# Patient Record
Sex: MALE | Race: WHITE | HISPANIC OR LATINO | URBAN - METROPOLITAN AREA
[De-identification: names, ages, dates, MRNs, and addresses within clinical notes are randomized per-mention and may not be internally consistent; named-entity substitution may affect disease eponyms.]

---

## 2017-07-22 ENCOUNTER — EMERGENCY (EMERGENCY)
Facility: HOSPITAL | Age: 47
LOS: 0 days | Discharge: HOME | End: 2017-07-22

## 2017-07-22 DIAGNOSIS — L50.0 ALLERGIC URTICARIA: ICD-10-CM

## 2017-07-22 DIAGNOSIS — Z88.8 ALLERGY STATUS TO OTHER DRUGS, MEDICAMENTS AND BIOLOGICAL SUBSTANCES STATUS: ICD-10-CM

## 2019-12-24 ENCOUNTER — OUTPATIENT (OUTPATIENT)
Dept: OUTPATIENT SERVICES | Facility: HOSPITAL | Age: 49
LOS: 1 days | Discharge: HOME | End: 2019-12-24

## 2019-12-24 ENCOUNTER — APPOINTMENT (OUTPATIENT)
Dept: UROLOGY | Facility: CLINIC | Age: 49
End: 2019-12-24
Payer: COMMERCIAL

## 2019-12-24 VITALS
BODY MASS INDEX: 22.9 KG/M2 | SYSTOLIC BLOOD PRESSURE: 138 MMHG | DIASTOLIC BLOOD PRESSURE: 82 MMHG | WEIGHT: 160 LBS | HEIGHT: 70 IN | HEART RATE: 73 BPM

## 2019-12-24 DIAGNOSIS — Z78.9 OTHER SPECIFIED HEALTH STATUS: ICD-10-CM

## 2019-12-24 DIAGNOSIS — Z80.42 FAMILY HISTORY OF MALIGNANT NEOPLASM OF PROSTATE: ICD-10-CM

## 2019-12-24 PROBLEM — Z00.00 ENCOUNTER FOR PREVENTIVE HEALTH EXAMINATION: Status: ACTIVE | Noted: 2019-12-24

## 2019-12-24 PROCEDURE — 99203 OFFICE O/P NEW LOW 30 MIN: CPT

## 2019-12-24 NOTE — ASSESSMENT
[FreeTextEntry1] : Physical exam shows him to be in fairly good shape and spirits. He has very mild left dorsolateral neurovascular thickening / Peyronie’s. Not enough to interfere with sexual activity. 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 His BC reflex was present but weak rectal tone was acceptable and his prostate is less than 10 g\par \par I believe the right orchalgia is due to the spermatocele we will get a scrotal ultrasound to verify. His voiding dysfunction may be related to tension and will have him keep a record of his intake and output I send it for a renal bladder ultrasound to make sure not missing anything and because of his family history we will get a PSA. When he comes back in will review the data will consider a flow study and depending on what I find it may or may not recommend behavior modification and/or medication\par

## 2019-12-24 NOTE — LETTER HEADER
[FreeTextEntry3] : Isidro Orourke M.D.\par Director of Urology\par [facility]\par [address] [place] [address], [address]\[place] [address]

## 2019-12-24 NOTE — LETTER BODY
[Dear  ___] : Dear ~REBEKA, [Consult Letter:] : I had the pleasure of evaluating your patient, [unfilled]. [Please see my note below.] : Please see my note below. [Consult Closing:] : Thank you very much for allowing me to participate in the care of this patient.  If you have any questions, please do not hesitate to contact me. [FreeTextEntry2] : Caprice BEEBE\par [facility]\par [address] [place] [address]\par [place] [phone]

## 2019-12-24 NOTE — HISTORY OF PRESENT ILLNESS
[FreeTextEntry1] : Ty is a 49-year-old male who has at least a two year history of intermittent right testicular pain. He seen Dr. Delgado at [facility] who felt there was nothing major. He is also had some vague lower urinary tract symptoms with mild incomplete emptying – two, frequency – two, intermittency – one, urgency – two, weak stream – three, straining – one. Wake up occasionally once at night and for the whole he is mostly satisfied though if we can help avoid better he would check. Is no history of blood pus infection no trouble with erections in his wife have two children that’s all his wife wanted.\par \par The family history is positive for a father who had prostate cancer was treated and is now HARVEY he said recent blood tests that were normal but he says he is not had a PSA checked. [Urinary Frequency] : urinary frequency [Urinary Urgency] : urinary urgency [Nocturia] : nocturia [Straining] : straining [Erectile Dysfunction] : no Erectile Dysfunction [Weak Stream] : weak stream [Dysuria] : no dysuria [Hematuria - Gross] : no gross hematuria [Flank Pain] : no flank pain [3] : 3 [Aching] : aching [Gradual] : gradual [___ Month(s) Ago] : [unfilled] month(s) ago [Intermittent] : intermittently [___ x Week] : occur [unfilled] times a week [Mild] : mild [___ Minute(s)] : last for [unfilled] minute(s) [Unchanged] : unchanged [None] : No relieving factors are noted [de-identified] : right testicle [de-identified] : touch

## 2019-12-24 NOTE — PHYSICAL EXAM
[General Appearance - Well Developed] : well developed [General Appearance - Well Nourished] : well nourished [Normal Appearance] : normal appearance [Well Groomed] : well groomed [General Appearance - In No Acute Distress] : no acute distress [Heart Rate And Rhythm] : Heart rate and rhythm were normal [Edema] : no peripheral edema [Respiration, Rhythm And Depth] : normal respiratory rhythm and effort [Exaggerated Use Of Accessory Muscles For Inspiration] : no accessory muscle use [Auscultation Breath Sounds / Voice Sounds] : lungs were clear to auscultation bilaterally [Abdomen Soft] : soft [Abdomen Tenderness] : non-tender [Abdomen Hernia] : no hernia was discovered [Costovertebral Angle Tenderness] : no ~M costovertebral angle tenderness [Urethral Meatus] : meatus normal [Penis Abnormality] : normal circumcised penis [Urinary Bladder Findings] : the bladder was normal on palpation [Scrotum] : the scrotum was normal [No Prostate Nodules] : no prostate nodules [Testes Mass (___cm)] : there were no testicular masses [Prostate Size ___ gm] : prostate size [unfilled] gm [] : no rash [Normal Station and Gait] : the gait and station were normal for the patient's age [Oriented To Time, Place, And Person] : oriented to person, place, and time [FreeTextEntry1] : DTR's & BC reflexes were intact though BC was weak [Affect] : the affect was normal [Mood] : the mood was normal [Not Anxious] : not anxious [Inguinal Lymph Nodes Enlarged Bilaterally] : inguinal

## 2019-12-26 DIAGNOSIS — N39.0 URINARY TRACT INFECTION, SITE NOT SPECIFIED: ICD-10-CM

## 2019-12-26 LAB
APPEARANCE: CLEAR
BACTERIA UR CULT: NORMAL
BILIRUBIN URINE: NEGATIVE
BLOOD URINE: NEGATIVE
COLOR: COLORLESS
GLUCOSE QUALITATIVE U: NEGATIVE
KETONES URINE: NEGATIVE
LEUKOCYTE ESTERASE URINE: NEGATIVE
NITRITE URINE: NEGATIVE
PH URINE: 7.5
PROTEIN URINE: NEGATIVE
SPECIFIC GRAVITY URINE: 1
UROBILINOGEN URINE: NORMAL

## 2019-12-27 LAB — URINE CYTOLOGY: NORMAL

## 2020-01-18 ENCOUNTER — FORM ENCOUNTER (OUTPATIENT)
Age: 50
End: 2020-01-18

## 2020-01-19 ENCOUNTER — OUTPATIENT (OUTPATIENT)
Dept: OUTPATIENT SERVICES | Facility: HOSPITAL | Age: 50
LOS: 1 days | Discharge: HOME | End: 2020-01-19
Payer: COMMERCIAL

## 2020-01-19 DIAGNOSIS — R39.14 FEELING OF INCOMPLETE BLADDER EMPTYING: ICD-10-CM

## 2020-01-19 PROCEDURE — 76870 US EXAM SCROTUM: CPT | Mod: 26

## 2020-01-19 PROCEDURE — 76770 US EXAM ABDO BACK WALL COMP: CPT | Mod: 26

## 2020-02-03 ENCOUNTER — APPOINTMENT (OUTPATIENT)
Dept: UROLOGY | Facility: CLINIC | Age: 50
End: 2020-02-03
Payer: COMMERCIAL

## 2020-02-03 VITALS
BODY MASS INDEX: 22.9 KG/M2 | HEART RATE: 56 BPM | SYSTOLIC BLOOD PRESSURE: 134 MMHG | WEIGHT: 160 LBS | DIASTOLIC BLOOD PRESSURE: 83 MMHG | HEIGHT: 70 IN

## 2020-02-03 PROCEDURE — 51798 US URINE CAPACITY MEASURE: CPT

## 2020-02-03 PROCEDURE — 99214 OFFICE O/P EST MOD 30 MIN: CPT | Mod: 25

## 2020-02-03 PROCEDURE — 51741 ELECTRO-UROFLOWMETRY FIRST: CPT

## 2020-02-03 NOTE — LETTER BODY
[Dear  ___] : Dear ~REBEKA, [Courtesy Letter:] : I had the pleasure of seeing your patient, [unfilled], in my office today. [Please see my note below.] : Please see my note below. [Consult Closing:] : Thank you very much for allowing me to participate in the care of this patient.  If you have any questions, please do not hesitate to contact me. [Sincerely,] : Sincerely, [FreeTextEntry2] : Caprice BEEBE\par [facility]\par [address] [place] [address]\par [place] [phone]

## 2020-02-03 NOTE — HISTORY OF PRESENT ILLNESS
[FreeTextEntry1] : Ty is a 49-year-old male who was seen on December 24, 2019 for some right testicular discomfort which he’s had her on and off for two years. He also had some vague lower urinary tract symptoms. Examination showed a right spermatocele which I felt was probably the cause of his pain some mild Peyronie’s which in and feel was clinically significant and he had a small prostate we sent him for a scrotal ultrasound, he can keep a record of his intake and output getting a renal bladder ultrasound and given his family history we got a PSA. Depending on the findings they were going to consider a flow study. When he came in he had about 80 ounces and the two hours before he got here had a stronger so flow study was done and it was looked at after we reviewed the record of his intake and output. [Urinary Urgency] : urinary urgency [Urinary Frequency] : urinary frequency [Nocturia] : nocturia [Straining] : straining [Weak Stream] : weak stream [Erectile Dysfunction] : no Erectile Dysfunction [Dysuria] : no dysuria [Hematuria - Gross] : no gross hematuria [3] : 3 [Flank Pain] : no flank pain [Aching] : aching [Gradual] : gradual [___ Month(s) Ago] : [unfilled] month(s) ago [___ x Week] : occur [unfilled] times a week [Intermittent] : intermittently [___ Minute(s)] : last for [unfilled] minute(s) [Mild] : mild [Unchanged] : unchanged [None] : No relieving factors are noted [de-identified] : right testicle [de-identified] : touch

## 2020-02-03 NOTE — LETTER HEADER
[FreeTextEntry3] : Isidro Orourke M.D.\par Director of Urology\par [facility]\par [address] [place] [address], [address]\[place] [address]

## 2020-02-03 NOTE — ASSESSMENT
[FreeTextEntry1] : With respect to his tenderness in the testicle I think it’s the spermatocele. When it grows large enough of the parietal Tunica vaginalis should eventually stretch or tear and the pain should go away. If he gets big enough for bothersome enough we can either try percutaneous puncture or a surgical excision but for now I would recommend doing nothing.\par \par With respect to his voiding symptoms if you drink a lot he fluid you going to urinate off and I don’t think this is a urologic issue just the amount that he’s drinking. He can try keeping his intake “down” to 2.5 L and see if his symptoms rashmi. If they do then this is a quality choice if he prefers to drink and urinate then to drink more and urinate more. If he prefers to urinate less than he will drink less. He can take in 4 L and not urinate out 2 ½ or more.\par

## 2020-08-03 ENCOUNTER — APPOINTMENT (OUTPATIENT)
Dept: UROLOGY | Facility: CLINIC | Age: 50
End: 2020-08-03
Payer: COMMERCIAL

## 2020-08-03 VITALS
HEART RATE: 64 BPM | BODY MASS INDEX: 24.34 KG/M2 | DIASTOLIC BLOOD PRESSURE: 88 MMHG | WEIGHT: 170 LBS | SYSTOLIC BLOOD PRESSURE: 128 MMHG | HEIGHT: 70 IN

## 2020-08-03 PROCEDURE — 99213 OFFICE O/P EST LOW 20 MIN: CPT

## 2020-08-03 NOTE — LETTER BODY
[Dear  ___] : Dear ~REBEKA, [Courtesy Letter:] : I had the pleasure of seeing your patient, [unfilled], in my office today. [Please see my note below.] : Please see my note below. [Consult Closing:] : Thank you very much for allowing me to participate in the care of this patient.  If you have any questions, please do not hesitate to contact me. [Sincerely,] : Sincerely, [FreeTextEntry2] : Caprice BEEBE\par [facility]\par [address] [place] [address]\par [place] [phone]

## 2020-08-03 NOTE — ASSESSMENT
[FreeTextEntry1] : His PSA is borderline elevated, at 1.1, with a very small prostate on rectal examination. Given his family history of prostate cancer I would  like to repeat a PSA, which was drawn today. We'll have a telemedicine conference to review the results.\par \par As far as his or culture he says it is no longer bothering him\par \par As far as the urinary symptoms is not bothering him enough that he would consider medication or further intervention at this point in time

## 2020-08-03 NOTE — PHYSICAL EXAM
[General Appearance - Well Nourished] : well nourished [Normal Appearance] : normal appearance [General Appearance - Well Developed] : well developed [General Appearance - In No Acute Distress] : no acute distress [Abdomen Soft] : soft [Well Groomed] : well groomed [Costovertebral Angle Tenderness] : no ~M costovertebral angle tenderness [Abdomen Tenderness] : non-tender [Urethral Meatus] : meatus normal [Urinary Bladder Findings] : the bladder was normal on palpation [Penis Abnormality] : normal circumcised penis [Scrotum] : the scrotum was normal [Testes Tenderness] : no tenderness of the testes [Testes Mass (___cm)] : there were no testicular masses [Edema] : no peripheral edema [Respiration, Rhythm And Depth] : normal respiratory rhythm and effort [] : no respiratory distress [Exaggerated Use Of Accessory Muscles For Inspiration] : no accessory muscle use [Oriented To Time, Place, And Person] : oriented to person, place, and time [Affect] : the affect was normal [Mood] : the mood was normal [Not Anxious] : not anxious [Normal Station and Gait] : the gait and station were normal for the patient's age [No Focal Deficits] : no focal deficits [Femoral Lymph Nodes Enlarged Bilaterally] : femoral [Inguinal Lymph Nodes Enlarged Bilaterally] : inguinal [FreeTextEntry1] : Right small spermatocele

## 2020-08-03 NOTE — LETTER HEADER
[FreeTextEntry3] : Isidro Orourke M.D.\par Director of Urology\par [facility]\par [address] [place] [address], [address]\[place] [address]

## 2020-08-03 NOTE — HISTORY OF PRESENT ILLNESS
[Urinary Urgency] : urinary urgency [Straining] : straining [Nocturia] : nocturia [Urinary Frequency] : urinary frequency [Weak Stream] : weak stream [None] : None [___ Month(s) Ago] : [unfilled] month(s) ago [FreeTextEntry1] : Ty is a 49-year-old male We have been following for right spermatocele, which was initially causing him discomfort, and history of bothersome lower urinary tract symptoms. Additionally he has a history of borderline age-specific elevated PSA with a family history of prostate cancer in his father, diagnosed in his 50s.\par \par Today he states he is voiding well and his testicular discomfort has virtually resolved.\par \par He has not yet obtained his PSA. [Erectile Dysfunction] : no Erectile Dysfunction [Dysuria] : no dysuria [Hematuria - Gross] : no gross hematuria [Flank Pain] : no flank pain

## 2020-08-07 LAB
PSA FREE FLD-MCNC: 61 %
PSA FREE SERPL-MCNC: 0.66 NG/ML
PSA SERPL-MCNC: 1.08 NG/ML

## 2020-08-13 ENCOUNTER — APPOINTMENT (OUTPATIENT)
Dept: UROLOGY | Facility: CLINIC | Age: 50
End: 2020-08-13
Payer: COMMERCIAL

## 2020-08-13 DIAGNOSIS — Z87.898 PERSONAL HISTORY OF OTHER SPECIFIED CONDITIONS: ICD-10-CM

## 2020-08-13 DIAGNOSIS — R39.198 OTHER DIFFICULTIES WITH MICTURITION: ICD-10-CM

## 2020-08-13 DIAGNOSIS — N50.819 TESTICULAR PAIN, UNSPECIFIED: ICD-10-CM

## 2020-08-13 DIAGNOSIS — R39.14 FEELING OF INCOMPLETE BLADDER EMPTYING: ICD-10-CM

## 2020-08-13 PROCEDURE — 99214 OFFICE O/P EST MOD 30 MIN: CPT | Mod: 95

## 2020-08-13 NOTE — PHYSICAL EXAM
[General Appearance - Well Developed] : well developed [General Appearance - Well Nourished] : well nourished [Normal Appearance] : normal appearance [Well Groomed] : well groomed [] : no respiratory distress [General Appearance - In No Acute Distress] : no acute distress [Respiration, Rhythm And Depth] : normal respiratory rhythm and effort [Oriented To Time, Place, And Person] : oriented to person, place, and time [Exaggerated Use Of Accessory Muscles For Inspiration] : no accessory muscle use [Mood] : the mood was normal [Affect] : the affect was normal [Not Anxious] : not anxious

## 2020-08-13 NOTE — ASSESSMENT
[FreeTextEntry1] : The PSA came back on August 7, 2028 1.08 and free of 61%. His previous ones January 24, 2020 was 1.1 and 45% so essentially this is no change over almost 7 months. I am comfortable with these numbers, it does not mean he does not have prostate cancer just means that the risk benefit of biopsy favors observation would recommend he get a PSA again in six – twelve months and we will see him then. Given the family history he’d like to get the next repeat value in six months. If it stays low then he would accept going to every 12 months. Obviously if his voiding symptoms or tenderness in the testicle become bothersome he can call and come back soon

## 2020-08-13 NOTE — HISTORY OF PRESENT ILLNESS
[Medical Office: ([facility])___] : at the medical office located in  [Home] : at home, [unfilled] , at the time of the visit. [Verbal consent obtained from patient] : the patient, [unfilled] [FreeTextEntry3] : he has received, reviewed and agreed to the telemedicine consent  [FreeTextEntry1] : I communicated with him by his cell phone at  [phone] and any email communications will be sent to [email or web address]\par \par Ty is a 50-year-old male who we had seen for a tender right testicle and mild low urinary tract symptoms. He was last seen on August 3 by which time his right testicle had improved and didn’t bother him, his voiding dysfunction had improved. However we had sent him for a PSA as his previous PSA was borderline elevated by age specific criteria and by PSA density. He forgot to get it and given the family history of prostate cancer he was going to get the PSA and elected to have a follow-up by TeleMed.\par  [None] : no symptoms

## 2020-08-13 NOTE — LETTER BODY
[Dear  ___] : Dear ~REBEKA, [Please see my note below.] : Please see my note below. [Courtesy Letter:] : I had the pleasure of seeing your patient, [unfilled], in my office today. [Sincerely,] : Sincerely, [Consult Closing:] : Thank you very much for allowing me to participate in the care of this patient.  If you have any questions, please do not hesitate to contact me. [FreeTextEntry2] : Caprice BEEBE\par [facility]\par [address] [place] [address]\par [place] [phone]

## 2020-08-13 NOTE — LETTER HEADER
[FreeTextEntry3] : Isidro Orourke M.D.\par Director of Urology\par [facility]\par [address] [place] [address], [address]\[place] [address]

## 2021-02-17 ENCOUNTER — APPOINTMENT (OUTPATIENT)
Dept: UROLOGY | Facility: CLINIC | Age: 51
End: 2021-02-17
Payer: COMMERCIAL

## 2021-02-17 VITALS
HEART RATE: 73 BPM | TEMPERATURE: 97.4 F | WEIGHT: 176.8 LBS | SYSTOLIC BLOOD PRESSURE: 143 MMHG | BODY MASS INDEX: 25.31 KG/M2 | DIASTOLIC BLOOD PRESSURE: 83 MMHG | HEIGHT: 70 IN

## 2021-02-17 PROCEDURE — 99213 OFFICE O/P EST LOW 20 MIN: CPT

## 2021-02-17 PROCEDURE — 99072 ADDL SUPL MATRL&STAF TM PHE: CPT

## 2021-02-17 NOTE — PHYSICAL EXAM
[General Appearance - Well Developed] : well developed [General Appearance - Well Nourished] : well nourished [Normal Appearance] : normal appearance [Well Groomed] : well groomed [General Appearance - In No Acute Distress] : no acute distress [Abdomen Tenderness] : non-tender [Abdomen Soft] : soft [Costovertebral Angle Tenderness] : no ~M costovertebral angle tenderness [Heart Rate And Rhythm] : Heart rate and rhythm were normal [Edema] : no peripheral edema [] : no respiratory distress [Respiration, Rhythm And Depth] : normal respiratory rhythm and effort [Exaggerated Use Of Accessory Muscles For Inspiration] : no accessory muscle use [Oriented To Time, Place, And Person] : oriented to person, place, and time [Affect] : the affect was normal [Mood] : the mood was normal [Not Anxious] : not anxious [Normal Station and Gait] : the gait and station were normal for the patient's age

## 2021-04-12 ENCOUNTER — APPOINTMENT (OUTPATIENT)
Dept: UROLOGY | Facility: CLINIC | Age: 51
End: 2021-04-12
Payer: COMMERCIAL

## 2021-04-12 PROCEDURE — 99214 OFFICE O/P EST MOD 30 MIN: CPT | Mod: 95

## 2021-04-12 NOTE — HISTORY OF PRESENT ILLNESS
[FreeTextEntry1] : Ty is a 50-year-old male last seen on 2020. He had some occasional pain in the right testicle which we felt was due to a spermatocele see an ultrasound and he had a question of elevated PSA which on repeat had come back well within normal limits he was supposed to get another one, went to the lab but the slip had  and instead of calling us he just came in now without having had the blood drawn.\par \par Please note towards the end he tells me he has pain as if there is a cut by his heinous I want to know if I would evaluated. no

## 2021-04-12 NOTE — ASSESSMENT
[FreeTextEntry1] : His PSA has been stable for over a year and well within the expected for his age range with a high free PSA \par \par As far as the spermatocele if and when it bothers him we will worry about it but for now watchful waiting is recommended

## 2021-04-12 NOTE — LETTER HEADER
[FreeTextEntry3] : Isidro Orourke M.D.\par Director of Urology\par [facility]\par [address] [place] [address], [address]\[place] [address]

## 2021-04-12 NOTE — LETTER BODY
[Dear  ___] : Dear ~REBEKA, [Courtesy Letter:] : I had the pleasure of seeing your patient, [unfilled], in my office today. [Please see my note below.] : Please see my note below. [Consult Closing:] : Thank you very much for allowing me to participate in the care of this patient.  If you have any questions, please do not hesitate to contact me. [Sincerely,] : Sincerely, [FreeTextEntry2] : Caprice BEEBE\par [facility]\par [address] [place] [address]\par [place] [phone]

## 2021-04-12 NOTE — LETTER HEADER
[FreeTextEntry3] : Isidro Orourke M.D.\par Director of Urology\par [facility]\par [address] [place] [address], [address]\[place] [address]

## 2021-04-12 NOTE — LETTER BODY
[Dear  ___] : Dear ~REBEKA, [Courtesy Letter:] : I had the pleasure of seeing your patient, [unfilled], in my office today. [Please see my note below.] : Please see my note below. [Consult Closing:] : Thank you very much for allowing me to participate in the care of this patient.  If you have any questions, please do not hesitate to contact me. [Sincerely,] : Sincerely, [FreeTextEntry2] : Caprice BEEBE\par [facility]\par [address] [place] [address]\par [place] [phone]

## 2021-04-12 NOTE — ASSESSMENT
[FreeTextEntry1] : With respect to the psa, He has not had orgasm in several days so we will draw it now. He can f/u telemed At which time we will see if this needs to be followed further. With respect to the orchalgia, There is NTD. The pain Is tolerable and eventually it will stop if when the spermatocele gets large enough to stretch the internal tunica a.k.a. parietal tunica vaginalis it at any point it becomes too painful we can always explore

## 2021-07-14 ENCOUNTER — NON-APPOINTMENT (OUTPATIENT)
Age: 51
End: 2021-07-14

## 2021-07-16 ENCOUNTER — NON-APPOINTMENT (OUTPATIENT)
Age: 51
End: 2021-07-16

## 2021-07-22 ENCOUNTER — APPOINTMENT (OUTPATIENT)
Dept: UROLOGY | Facility: CLINIC | Age: 51
End: 2021-07-22
Payer: COMMERCIAL

## 2021-07-22 VITALS
SYSTOLIC BLOOD PRESSURE: 134 MMHG | HEIGHT: 70 IN | HEART RATE: 62 BPM | WEIGHT: 175 LBS | BODY MASS INDEX: 25.05 KG/M2 | DIASTOLIC BLOOD PRESSURE: 89 MMHG

## 2021-07-22 PROCEDURE — 99213 OFFICE O/P EST LOW 20 MIN: CPT

## 2021-07-22 PROCEDURE — 99072 ADDL SUPL MATRL&STAF TM PHE: CPT

## 2021-07-22 NOTE — HISTORY OF PRESENT ILLNESS
[Currently Experiencing ___] :  [unfilled] [FreeTextEntry1] : Ty is a 51-year-old male who we have been following for history of testicular discomfort as well as question of elevated PSA.\par \par His testicular discomfort was likely secondary to spermatocele and we had him on observation.  He presents today complaining of worsening testicular discomfort on the right as well as some intermittent discomfort on the left.  He reports that his pain was exacerbated while using rectal lidocaine for hemorrhoids.\par \par He reports he is not having intercourse as often as he used to and feels some type of congestion as well.\par \par He is due to follow-up next year with a PSA\par

## 2021-07-22 NOTE — LETTER BODY
[Dear  ___] : Dear ~REBEKA, [Courtesy Letter:] : I had the pleasure of seeing your patient, [unfilled], in my office today. [Please see my note below.] : Please see my note below. [Consult Closing:] : Thank you very much for allowing me to participate in the care of this patient.  If you have any questions, please do not hesitate to contact me. [Sincerely,] : Sincerely, [FreeTextEntry2] : Caprice BEEBE\par [facility]\par [address] [place] [address]\par [place] [phone]

## 2021-07-22 NOTE — LETTER HEADER
[FreeTextEntry3] : Isidro Orourke M.D.\par Director of Urology\par [facility]\par [address] [place] [address], [address]\[place] [address]

## 2021-07-22 NOTE — ASSESSMENT
[FreeTextEntry1] : Testicular discomfort may be secondary to congestion.  Recommend more frequent ejaculation than once every three months and i spoke to his wife by phone while he was still in the exam room and explained the situation. \par He will obtain scrotal ultrasound and follow-up for review.

## 2021-07-31 ENCOUNTER — OUTPATIENT (OUTPATIENT)
Dept: OUTPATIENT SERVICES | Facility: HOSPITAL | Age: 51
LOS: 1 days | Discharge: HOME | End: 2021-07-31
Payer: COMMERCIAL

## 2021-07-31 DIAGNOSIS — N43.40 SPERMATOCELE OF EPIDIDYMIS, UNSPECIFIED: ICD-10-CM

## 2021-07-31 PROCEDURE — 76870 US EXAM SCROTUM: CPT | Mod: 26

## 2021-08-02 ENCOUNTER — APPOINTMENT (OUTPATIENT)
Dept: UROLOGY | Facility: CLINIC | Age: 51
End: 2021-08-02
Payer: COMMERCIAL

## 2021-08-02 VITALS
HEIGHT: 70 IN | SYSTOLIC BLOOD PRESSURE: 133 MMHG | WEIGHT: 174 LBS | DIASTOLIC BLOOD PRESSURE: 84 MMHG | HEART RATE: 67 BPM | BODY MASS INDEX: 24.91 KG/M2

## 2021-08-02 PROCEDURE — 99214 OFFICE O/P EST MOD 30 MIN: CPT

## 2021-08-02 RX ORDER — CLOTRIMAZOLE 10 MG/G
1 CREAM TOPICAL
Qty: 1 | Refills: 2 | Status: ACTIVE | COMMUNITY
Start: 2021-08-02 | End: 1900-01-01

## 2021-08-02 RX ORDER — CETIRIZINE HCL 10 MG
TABLET ORAL
Refills: 0 | Status: DISCONTINUED | COMMUNITY
End: 2021-07-19

## 2021-08-02 NOTE — LETTER HEADER
[FreeTextEntry3] : Isidro Orourke M.D.\par Director of Urology\par [facility]\par [address] [place] [address], [address]\[place] [address]

## 2021-08-02 NOTE — ASSESSMENT
[FreeTextEntry1] : The right spermatocele really has not changed there is no signs of infection there is no signs of any cancer that is really too small to go after surgically.  He wonders if we could pop it with a needle and the answer is yes but it comes back and I do not think it is worth it.  He and his wife did consider having a little more sexual activity not as much as he like but a lot better than it was before with surgery once every 3 months.  We will see if they can continue perhaps once a week once every 2 weeks if that helps his congestion and makes him feel better\par \par As far as the PSA we will check that as scheduled next year and he will come back after that\par \par As far as the skin changes it may be fungal especially the more proximal 1 hour have him take some Chlortrimazole with that does not help after a week or 2 we will try some 1% hydrocortisone and if that does not help after a week or 2 will go to a dermatologist

## 2021-08-02 NOTE — LETTER BODY
[Dear  ___] : Dear ~REBEKA, [Courtesy Letter:] : I had the pleasure of seeing your patient, [unfilled], in my office today. [Please see my note below.] : Please see my note below. [Consult Closing:] : Thank you very much for allowing me to participate in the care of this patient.  If you have any questions, please do not hesitate to contact me. [Sincerely,] : Sincerely, [FreeTextEntry2] : Caprice BEEBE\par [facility]\par [address] [place] [address]\par [place] [phone]

## 2021-08-02 NOTE — PHYSICAL EXAM
[General Appearance - Well Developed] : well developed [General Appearance - Well Nourished] : well nourished [Normal Appearance] : normal appearance [Well Groomed] : well groomed [General Appearance - In No Acute Distress] : no acute distress [FreeTextEntry1] : See above [] : no respiratory distress [Respiration, Rhythm And Depth] : normal respiratory rhythm and effort [Exaggerated Use Of Accessory Muscles For Inspiration] : no accessory muscle use [Oriented To Time, Place, And Person] : oriented to person, place, and time [Affect] : the affect was normal [Mood] : the mood was normal [Not Anxious] : not anxious [Normal Station and Gait] : the gait and station were normal for the patient's age

## 2021-08-02 NOTE — HISTORY OF PRESENT ILLNESS
[FreeTextEntry1] : Ty is a 43-year-old male 1 July 19, 1970 last seen July 22, 2021 for testicular discomfort we know he has a right spermatocele he felt that the discomfort was getting worse.  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  We had sent him for an ultrasound and is here for review [Currently Experiencing ___] :  [unfilled] [3] : 3 [None] : There is no radiation [Aching] : aching [Intermittent] : intermittently [Mild] : mild [Worsening] : worsening [de-identified] : Right testicle [de-identified] : Worse over the last couple of months [de-identified] : Touch [de-identified] : Not touching

## 2021-08-18 ENCOUNTER — APPOINTMENT (OUTPATIENT)
Dept: UROLOGY | Facility: CLINIC | Age: 51
End: 2021-08-18

## 2021-10-07 ENCOUNTER — APPOINTMENT (OUTPATIENT)
Dept: UROLOGY | Facility: CLINIC | Age: 51
End: 2021-10-07

## 2022-03-01 ENCOUNTER — APPOINTMENT (OUTPATIENT)
Dept: UROLOGY | Facility: CLINIC | Age: 52
End: 2022-03-01

## 2022-06-23 ENCOUNTER — APPOINTMENT (OUTPATIENT)
Dept: UROLOGY | Facility: CLINIC | Age: 52
End: 2022-06-23
Payer: COMMERCIAL

## 2022-06-23 VITALS
DIASTOLIC BLOOD PRESSURE: 66 MMHG | BODY MASS INDEX: 24.62 KG/M2 | HEART RATE: 64 BPM | WEIGHT: 172 LBS | SYSTOLIC BLOOD PRESSURE: 128 MMHG | HEIGHT: 70 IN

## 2022-06-23 DIAGNOSIS — R21 RASH AND OTHER NONSPECIFIC SKIN ERUPTION: ICD-10-CM

## 2022-06-23 DIAGNOSIS — N50.812 RIGHT TESTICULAR PAIN: ICD-10-CM

## 2022-06-23 DIAGNOSIS — R97.20 ELEVATED PROSTATE, SPECIFIC ANTIGEN [PSA]: ICD-10-CM

## 2022-06-23 DIAGNOSIS — N43.40 SPERMATOCELE OF EPIDIDYMIS, UNSPECIFIED: ICD-10-CM

## 2022-06-23 DIAGNOSIS — N50.811 RIGHT TESTICULAR PAIN: ICD-10-CM

## 2022-06-23 PROCEDURE — 99213 OFFICE O/P EST LOW 20 MIN: CPT

## 2022-06-23 RX ORDER — FLUOCINOLONE ACETONIDE 0.11 MG/ML
0.01 OIL TOPICAL
Qty: 118 | Refills: 0 | Status: ACTIVE | COMMUNITY
Start: 2022-03-18

## 2022-06-23 NOTE — PHYSICAL EXAM
[General Appearance - Well Developed] : well developed [General Appearance - Well Nourished] : well nourished [Normal Appearance] : normal appearance [Well Groomed] : well groomed [General Appearance - In No Acute Distress] : no acute distress [Abdomen Soft] : soft [Abdomen Tenderness] : non-tender [Costovertebral Angle Tenderness] : no ~M costovertebral angle tenderness [Penis Abnormality] : normal circumcised penis [FreeTextEntry1] : Her proximal strength is still dry but nothing terrible, there is no tenderness of the testicles the head of the right epididymis still has that small bubble which is slightly tender [Edema] : no peripheral edema [] : no respiratory distress [Respiration, Rhythm And Depth] : normal respiratory rhythm and effort [Exaggerated Use Of Accessory Muscles For Inspiration] : no accessory muscle use [Oriented To Time, Place, And Person] : oriented to person, place, and time [Affect] : the affect was normal [Mood] : the mood was normal [Not Anxious] : not anxious [Normal Station and Gait] : the gait and station were normal for the patient's age [No Focal Deficits] : no focal deficits

## 2022-06-23 NOTE — HISTORY OF PRESENT ILLNESS
[FreeTextEntry1] : Tariq is a 51-year-old male born July 19, 1970 last seen August 2, 2021.  At that time he was complaining of penile skin changes.  We sent it to dermatologist who gave him some cortisone cream which helped but he stopped it came back, he did want to keep him on steroids so the dermatologist gave him Holliday on aquaphor and when he uses it works he is just wondering what he needs to keep using it\par \par He had testicular discomfort which I felt was due to a right spermatocele the discomfort is intermittent and is not really that bothersome.  He had had questions about his PSA it was low enough that we elected to wait a year and recheck it, that was done May 19 and he is here for review.  He has no other urologic issues other than occasional incomplete emptying with double voiding and has not enough that he is willing to consider medication for have a procedure so for now we will just continue to wait and watch [Currently Experiencing ___] :  [unfilled] [3] : 3 [None] : There is no radiation [Aching] : aching [Intermittent] : intermittently [Mild] : mild [Worsening] : worsening [de-identified] : Right testicle [de-identified] : improved over the last couple of months [de-identified] : Touch [de-identified] : Not touching

## 2022-06-23 NOTE — LETTER HEADER
[FreeTextEntry3] : Isidro Orourke M.D.\par Director Emeritus of Urology\par [facility]\par [address] [place] [address], [address]\[place] [address]

## 2022-06-23 NOTE — ASSESSMENT
[FreeTextEntry1] : PSA is stable quite low I do not think it needs to be rechecked for at least 2 years.\par The testicles do not really bother him and the area that does is the right epididymis plus minus the sexual frequency of once per month there is really not good for prostatic congestion but not something I can help him with\par \par The dry skin is something he will deal with with the dermatologist.  Please note the irritation of the penile skin is not related to sexual activity or masturbation

## 2022-06-23 NOTE — LETTER BODY
[Courtesy Letter:] : I had the pleasure of seeing your patient, [unfilled], in my office today. [Please see my note below.] : Please see my note below. [Sincerely,] : Sincerely, [FreeTextEntry2] : To whom it may concern

## 2023-09-07 NOTE — HISTORY OF PRESENT ILLNESS
You were seen in the Electrophysiology Clinic today by: Dr Sherie Barrera    Plan:   Medication Changes: Take Eliquis twice a day    Labs/Tests Needed: none    Follow up Visit: as needed    Further Instructions: referral to Dr. JORGE Blue      If you have further questions, please utilize [employer] to contact us.     Your Care Team:    [facility]   Telephone Number     Nurse Line  Makenzie Martinez, RN   Jessica Jiménez, RN  Jeffrey Vega RN   [phone]     For scheduling appointments:   [facility]   [phone]   For procedure scheduling:    Nancy Duque     [phone]   For the Device Clinic (Pacemakers, ICDs, Loop Recorders)    During business hours: [phone]  After business hours:   [phone]- select option 4 and ask for job code [id number].       On-call cardiologist for after hours or on weekends:   [phone], option #4, and ask to speak to the on-call cardiologist.     Cardiovascular Clinic:   [address] [place] [address]. [place] [address]      As always, Thank you for trusting us with your health care needs!      [Home] : at home, [unfilled] , at the time of the visit. [Medical Office: ([facility])___] : at the medical office located in  [Verbal consent obtained from patient] : the patient, [unfilled] [FreeTextEntry3] : .consent [FreeTextEntry1] : I communicated with him by his cell phone at [phone] and any email communications will be sent to email of [email or web address]\par \par Ty is a 50-year-old male who was last seen on 2021 and before that in 2020 he had a question of an elevated PSA and occasional pain in the right testicle which we felt was due to spermatocele he had a repeat PSA which was normal we saw him in February the PSA slip had  so we sent them to have it drawn. He still had the tender spermatocele which we decided not to treat until and if the spermatocele gets big enough that hopefully it will stretch the tunica and will stop hurting whether pain becomes more of a problem. He is here today to TeleMed to go over the PSA value.\par \par \par telephone [phone]\par email of [email or web address]

## 2024-12-13 ENCOUNTER — APPOINTMENT (OUTPATIENT)
Dept: UROLOGY | Facility: CLINIC | Age: 54
End: 2024-12-13
Payer: COMMERCIAL

## 2024-12-13 VITALS
HEART RATE: 72 BPM | TEMPERATURE: 97.6 F | SYSTOLIC BLOOD PRESSURE: 135 MMHG | RESPIRATION RATE: 18 BRPM | HEIGHT: 70 IN | OXYGEN SATURATION: 69 % | WEIGHT: 167 LBS | DIASTOLIC BLOOD PRESSURE: 84 MMHG | BODY MASS INDEX: 23.91 KG/M2

## 2024-12-13 DIAGNOSIS — Z12.5 ENCOUNTER FOR SCREENING FOR MALIGNANT NEOPLASM OF PROSTATE: ICD-10-CM

## 2024-12-13 PROCEDURE — 99213 OFFICE O/P EST LOW 20 MIN: CPT

## 2024-12-13 PROCEDURE — G2211 COMPLEX E/M VISIT ADD ON: CPT | Mod: NC

## 2025-06-02 DIAGNOSIS — R39.89 OTHER SYMPTOMS AND SIGNS INVOLVING THE GENITOURINARY SYSTEM: ICD-10-CM

## 2025-06-06 ENCOUNTER — RESULT REVIEW (OUTPATIENT)
Age: 55
End: 2025-06-06

## 2025-06-06 ENCOUNTER — OUTPATIENT (OUTPATIENT)
Dept: OUTPATIENT SERVICES | Facility: HOSPITAL | Age: 55
LOS: 1 days | End: 2025-06-06
Payer: COMMERCIAL

## 2025-06-06 DIAGNOSIS — Z00.8 ENCOUNTER FOR OTHER GENERAL EXAMINATION: ICD-10-CM

## 2025-06-06 DIAGNOSIS — N50.811 RIGHT TESTICULAR PAIN: ICD-10-CM

## 2025-06-06 DIAGNOSIS — R39.89 OTHER SYMPTOMS AND SIGNS INVOLVING THE GENITOURINARY SYSTEM: ICD-10-CM

## 2025-06-06 PROCEDURE — 76870 US EXAM SCROTUM: CPT | Mod: 26

## 2025-06-06 PROCEDURE — 76770 US EXAM ABDO BACK WALL COMP: CPT

## 2025-06-06 PROCEDURE — 76770 US EXAM ABDO BACK WALL COMP: CPT | Mod: 26

## 2025-06-06 PROCEDURE — 76870 US EXAM SCROTUM: CPT

## 2025-06-07 DIAGNOSIS — R39.89 OTHER SYMPTOMS AND SIGNS INVOLVING THE GENITOURINARY SYSTEM: ICD-10-CM

## 2025-06-07 DIAGNOSIS — N50.811 RIGHT TESTICULAR PAIN: ICD-10-CM

## 2025-06-11 ENCOUNTER — APPOINTMENT (OUTPATIENT)
Dept: UROLOGY | Facility: CLINIC | Age: 55
End: 2025-06-11
Payer: COMMERCIAL

## 2025-06-11 PROBLEM — R10.9 LEFT FLANK PAIN: Status: ACTIVE | Noted: 2025-06-11

## 2025-06-11 PROBLEM — R10.9 RIGHT FLANK PAIN: Status: ACTIVE | Noted: 2025-06-11

## 2025-06-11 PROCEDURE — 99214 OFFICE O/P EST MOD 30 MIN: CPT

## 2025-06-12 ENCOUNTER — NON-APPOINTMENT (OUTPATIENT)
Age: 55
End: 2025-06-12

## 2025-06-13 ENCOUNTER — NON-APPOINTMENT (OUTPATIENT)
Age: 55
End: 2025-06-13

## 2025-06-13 LAB — BACTERIA UR CULT: NORMAL

## 2025-08-12 ENCOUNTER — EMERGENCY (EMERGENCY)
Facility: HOSPITAL | Age: 55
LOS: 0 days | Discharge: HOME | End: 2025-08-12
Attending: STUDENT IN AN ORGANIZED HEALTH CARE EDUCATION/TRAINING PROGRAM
Payer: COMMERCIAL

## 2025-08-12 VITALS
DIASTOLIC BLOOD PRESSURE: 92 MMHG | WEIGHT: 162.04 LBS | HEART RATE: 60 BPM | RESPIRATION RATE: 16 BRPM | OXYGEN SATURATION: 98 % | HEIGHT: 70 IN | TEMPERATURE: 98 F | SYSTOLIC BLOOD PRESSURE: 137 MMHG
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
## 2025-08-12 DIAGNOSIS — Z88.6 ALLERGY STATUS TO ANALGESIC AGENT: ICD-10-CM

## 2025-08-12 DIAGNOSIS — Z23 ENCOUNTER FOR IMMUNIZATION: ICD-10-CM

## 2025-08-12 DIAGNOSIS — S61.011A LACERATION WITHOUT FOREIGN BODY OF RIGHT THUMB WITHOUT DAMAGE TO NAIL, INITIAL ENCOUNTER: ICD-10-CM
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
## 2025-08-12 PROCEDURE — 12001 RPR S/N/AX/GEN/TRNK 2.5CM/<: CPT

## 2025-08-12 PROCEDURE — 90714 TD VACC NO PRESV 7 YRS+ IM: CPT

## 2025-08-12 PROCEDURE — 90471 IMMUNIZATION ADMIN: CPT

## 2025-08-12 PROCEDURE — 99283 EMERGENCY DEPT VISIT LOW MDM: CPT | Mod: 25

## 2025-08-12 PROCEDURE — 99284 EMERGENCY DEPT VISIT MOD MDM: CPT | Mod: 25

## 2025-08-13 ENCOUNTER — APPOINTMENT (OUTPATIENT)
Dept: UROLOGY | Facility: CLINIC | Age: 55
End: 2025-08-13

## 2025-08-21 ENCOUNTER — APPOINTMENT (OUTPATIENT)
Dept: ORTHOPEDIC SURGERY | Facility: CLINIC | Age: 55
End: 2025-08-21
Payer: COMMERCIAL

## 2025-08-21 VITALS — HEIGHT: 70 IN

## 2025-08-21 DIAGNOSIS — S61.011A LACERATION W/OUT FOREIGN BODY OF RIGHT THUMB W/OUT DAMAGE TO NAIL, INITIAL ENCOUNTER: ICD-10-CM

## 2025-08-21 PROCEDURE — 99203 OFFICE O/P NEW LOW 30 MIN: CPT

## 2025-08-21 PROCEDURE — S0630: CPT

## 2025-08-25 PROBLEM — S61.011A LACERATION OF RIGHT THUMB WITHOUT FOREIGN BODY WITHOUT DAMAGE TO NAIL, INITIAL ENCOUNTER: Status: ACTIVE | Noted: 2025-08-25
